# Patient Record
Sex: FEMALE | Race: WHITE
[De-identification: names, ages, dates, MRNs, and addresses within clinical notes are randomized per-mention and may not be internally consistent; named-entity substitution may affect disease eponyms.]

---

## 2018-03-04 NOTE — RAD
RADIOGRAPH CHEST 1 VIEW:

 

HISTORY: 

A 62-year-old female with dyspnea.

 

FINDINGS:

There is hyperinflation of the lungs, consistent with COPD.  There is no evidence of air space densit
y, pneumothorax, or pulmonary edema.  The lateral costophrenic angles are sharp.  The cardiomediastin
al silhouette is normal.

 

IMPRESSION:

1) No acute pulmonary findings.

2) Emphysema.

 

 

faizan []

 

POS: DION[facility]

## 2018-03-04 NOTE — CT
CT OF THE THORAX WITH IV CONTRAST:

 

INDICATION: 

History of posterior right rib mass.

 

FINDINGS: 

There is a healing right posterior 10th rib fracture.  There are healing suspected posterior 9th and 
10th rib fractures.  There is also a nondisplaced healing right posterolateral 8th rib fracture.

 

No additional acute osseous abnormality is evident.  There are healed lateral left 9th and posterolat
eral left 10th rib fractures.  

 

There is emphysematous change.  There is calcified granuloma within the right middle lobe.  Calcified
 lymph nodes within the right hilar region.  

 

IMPRESSION: 

1.  Healing right 8th through 10th rib fractures seen posterolateral and posteriorly.

 

2.  Emphysema.

 

3.  Findings of prior granulomatous disease.

 

POS: H

## 2019-11-12 ENCOUNTER — HOSPITAL ENCOUNTER (EMERGENCY)
Dept: HOSPITAL 18 - NAV ERS | Age: 64
Discharge: HOME | End: 2019-11-12
Payer: SELF-PAY

## 2019-11-12 DIAGNOSIS — F17.210: ICD-10-CM

## 2019-11-12 DIAGNOSIS — Z79.1: ICD-10-CM

## 2019-11-12 DIAGNOSIS — Z79.899: ICD-10-CM

## 2019-11-12 DIAGNOSIS — S20.211A: Primary | ICD-10-CM

## 2019-11-12 DIAGNOSIS — I10: ICD-10-CM
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
## 2019-11-12 DIAGNOSIS — J44.9: ICD-10-CM

## 2019-11-12 LAB
ALBUMIN SERPL BCG-MCNC: 4.3 G/DL (ref 3.4–4.8)
ALP SERPL-CCNC: 70 U/L (ref 40–110)
ALT SERPL W P-5'-P-CCNC: 8 U/L (ref 8–55)
ANION GAP SERPL CALC-SCNC: 16 MMOL/L (ref 10–20)
AST SERPL-CCNC: 12 U/L (ref 5–34)
BASOPHILS # BLD AUTO: 0.1 THOU/UL (ref 0–0.2)
BASOPHILS NFR BLD AUTO: 1.5 % (ref 0–1)
BILIRUB SERPL-MCNC: 0.2 MG/DL (ref 0.2–1.2)
BUN SERPL-MCNC: 21 MG/DL (ref 9.8–20.1)
CALCIUM SERPL-MCNC: 9.8 MG/DL (ref 7.8–10.44)
CHLORIDE SERPL-SCNC: 105 MMOL/L (ref 98–107)
CK SERPL-CCNC: 69 U/L (ref 29–168)
CO2 SERPL-SCNC: 26 MMOL/L (ref 23–31)
CREAT CL PREDICTED SERPL C-G-VRATE: 0 ML/MIN (ref 70–130)
EOSINOPHIL # BLD AUTO: 0.7 THOU/UL (ref 0–0.7)
EOSINOPHIL NFR BLD AUTO: 8.2 % (ref 0–10)
GLOBULIN SER CALC-MCNC: 2.9 G/DL (ref 2.4–3.5)
GLUCOSE SERPL-MCNC: 119 MG/DL (ref 80–115)
HGB BLD-MCNC: 12.9 G/DL (ref 12–16)
LIPASE SERPL-CCNC: 61 U/L (ref 8–78)
LYMPHOCYTES # BLD AUTO: 1.8 THOU/UL (ref 1.2–3.4)
LYMPHOCYTES NFR BLD AUTO: 19.6 % (ref 21–51)
MCH RBC QN AUTO: 29.5 PG (ref 27–31)
MCV RBC AUTO: 89.7 FL (ref 78–98)
MONOCYTES # BLD AUTO: 0.9 THOU/UL (ref 0.11–0.59)
MONOCYTES NFR BLD AUTO: 10 % (ref 0–10)
NEUTROPHILS # BLD AUTO: 5.5 THOU/UL (ref 1.4–6.5)
NEUTROPHILS NFR BLD AUTO: 60.7 % (ref 42–75)
PLATELET # BLD AUTO: 215 THOU/UL (ref 130–400)
POTASSIUM SERPL-SCNC: 3.9 MMOL/L (ref 3.5–5.1)
RBC # BLD AUTO: 4.36 MILL/UL (ref 4.2–5.4)
SODIUM SERPL-SCNC: 143 MMOL/L (ref 136–145)
WBC # BLD AUTO: 9.1 THOU/UL (ref 4.8–10.8)

## 2019-11-12 PROCEDURE — 84484 ASSAY OF TROPONIN QUANT: CPT

## 2019-11-12 PROCEDURE — 93005 ELECTROCARDIOGRAM TRACING: CPT

## 2019-11-12 PROCEDURE — 82550 ASSAY OF CK (CPK): CPT

## 2019-11-12 PROCEDURE — 83880 ASSAY OF NATRIURETIC PEPTIDE: CPT

## 2019-11-12 PROCEDURE — 83690 ASSAY OF LIPASE: CPT

## 2019-11-12 PROCEDURE — 80053 COMPREHEN METABOLIC PANEL: CPT

## 2019-11-12 PROCEDURE — 85025 COMPLETE CBC W/AUTO DIFF WBC: CPT

## 2019-11-12 PROCEDURE — 71250 CT THORAX DX C-: CPT

## 2019-11-12 NOTE — CT
CT chest noncontrast



HISTORY: Chest pain.



FINDINGS: Lungs remain prominently hyperinflated with scattered mild bullae formation. Calcified gran
ulomata and mediastinal lymph nodes are consistent with healed granulomatous disease. No

pneumothorax or pleural fluid.



Lack of contrast limits evaluation of the soft tissues. Nonspecific lymph nodes scattered about the m
ediastinum. Old right rib fractures again demonstrated.











IMPRESSION: Emphysema. No acute pulmonary abnormalities are demonstrated.



Atherosclerosis.



Reported By: ARNOLD Bender 

Electronically Signed:  11/12/2019 6:43 PM

## 2019-12-08 ENCOUNTER — HOSPITAL ENCOUNTER (EMERGENCY)
Dept: HOSPITAL 92 - ERS | Age: 64
Discharge: HOME | End: 2019-12-08
Payer: SELF-PAY

## 2019-12-08 DIAGNOSIS — Z79.51: ICD-10-CM

## 2019-12-08 DIAGNOSIS — J44.1: Primary | ICD-10-CM

## 2019-12-08 DIAGNOSIS — Z86.73: ICD-10-CM

## 2019-12-08 DIAGNOSIS — F32.9: ICD-10-CM

## 2019-12-08 DIAGNOSIS — Z79.899: ICD-10-CM

## 2019-12-08 DIAGNOSIS — F17.210: ICD-10-CM

## 2019-12-08 DIAGNOSIS — I10: ICD-10-CM

## 2019-12-08 PROCEDURE — 94640 AIRWAY INHALATION TREATMENT: CPT

## 2019-12-08 PROCEDURE — 71045 X-RAY EXAM CHEST 1 VIEW: CPT

## 2019-12-08 NOTE — RAD
EXAM: Single view of the chest



HISTORY:   Cough for one week



COMPARISON: 3/4/2018



FINDINGS: Single view of the chest shows a normal sized cardiomediastinal silhouette.  Increased inte
rstitial markings are present.  There is no evidence of consolidation, mass, or pleural effusion.

The bones are unremarkable.



IMPRESSION: No evidence of acute cardiopulmonary disease



Reported By: Chinmay Sheets 

Electronically Signed:  12/8/2019 3:05 PM

## 2020-07-03 ENCOUNTER — HOSPITAL ENCOUNTER (EMERGENCY)
Dept: HOSPITAL 18 - NAV ERS | Age: 65
Discharge: HOME | End: 2020-07-03
Payer: SELF-PAY

## 2020-07-03 DIAGNOSIS — M25.552: Primary | ICD-10-CM

## 2020-07-03 DIAGNOSIS — Z79.899: ICD-10-CM

## 2020-07-03 DIAGNOSIS — Z86.73: ICD-10-CM

## 2020-07-03 DIAGNOSIS — G47.00: ICD-10-CM

## 2020-07-03 DIAGNOSIS — Z79.51: ICD-10-CM

## 2020-07-03 DIAGNOSIS — J44.9: ICD-10-CM

## 2020-07-03 DIAGNOSIS — I10: ICD-10-CM

## 2020-07-03 DIAGNOSIS — F32.9: ICD-10-CM

## 2020-07-03 DIAGNOSIS — F17.210: ICD-10-CM

## 2020-07-03 PROCEDURE — 99283 EMERGENCY DEPT VISIT LOW MDM: CPT

## 2020-07-06 ENCOUNTER — HOSPITAL ENCOUNTER (EMERGENCY)
Dept: HOSPITAL 18 - NAV ERS | Age: 65
Discharge: HOME | End: 2020-07-06
Payer: SELF-PAY

## 2020-07-06 DIAGNOSIS — Z79.899: ICD-10-CM

## 2020-07-06 DIAGNOSIS — Z86.73: ICD-10-CM

## 2020-07-06 DIAGNOSIS — Z79.51: ICD-10-CM

## 2020-07-06 DIAGNOSIS — M54.9: ICD-10-CM

## 2020-07-06 DIAGNOSIS — F32.9: Primary | ICD-10-CM

## 2020-07-06 DIAGNOSIS — I10: ICD-10-CM

## 2020-07-06 DIAGNOSIS — J44.9: ICD-10-CM

## 2020-07-06 DIAGNOSIS — Z76.0: ICD-10-CM

## 2020-07-06 DIAGNOSIS — G89.29: ICD-10-CM

## 2020-07-06 DIAGNOSIS — Z87.891: ICD-10-CM

## 2020-07-06 LAB
ALBUMIN SERPL BCG-MCNC: 4.2 G/DL (ref 3.4–4.8)
ALP SERPL-CCNC: 75 U/L (ref 40–110)
ALT SERPL W P-5'-P-CCNC: 7 U/L (ref 8–55)
ANION GAP SERPL CALC-SCNC: 14 MMOL/L (ref 10–20)
APAP SERPL-MCNC: (no result) MCG/ML (ref 10–30)
AST SERPL-CCNC: 10 U/L (ref 5–34)
BASOPHILS # BLD AUTO: 0.1 THOU/UL (ref 0–0.2)
BASOPHILS NFR BLD AUTO: 1.2 % (ref 0–1)
BILIRUB SERPL-MCNC: 0.2 MG/DL (ref 0.2–1.2)
BUN SERPL-MCNC: 14 MG/DL (ref 9.8–20.1)
CALCIUM SERPL-MCNC: 9.9 MG/DL (ref 7.8–10.44)
CHLORIDE SERPL-SCNC: 102 MMOL/L (ref 98–107)
CO2 SERPL-SCNC: 24 MMOL/L (ref 23–31)
CREAT CL PREDICTED SERPL C-G-VRATE: 0 ML/MIN (ref 70–130)
DRUG SCREEN CUTOFF: (no result)
EOSINOPHIL # BLD AUTO: 0.7 THOU/UL (ref 0–0.7)
EOSINOPHIL NFR BLD AUTO: 8.7 % (ref 0–10)
GLOBULIN SER CALC-MCNC: 2.7 G/DL (ref 2.4–3.5)
GLUCOSE SERPL-MCNC: 139 MG/DL (ref 80–115)
HGB BLD-MCNC: 12.9 G/DL (ref 12–16)
LYMPHOCYTES # BLD AUTO: 1.3 THOU/UL (ref 1.2–3.4)
LYMPHOCYTES NFR BLD AUTO: 16 % (ref 21–51)
MCH RBC QN AUTO: 29.1 PG (ref 27–31)
MCV RBC AUTO: 95.5 FL (ref 78–98)
MEDTOX CONTROL LINE VALID?: (no result)
MONOCYTES # BLD AUTO: 0.5 THOU/UL (ref 0.11–0.59)
MONOCYTES NFR BLD AUTO: 6.8 % (ref 0–10)
NEUTROPHILS # BLD AUTO: 5.3 THOU/UL (ref 1.4–6.5)
NEUTROPHILS NFR BLD AUTO: 67.3 % (ref 42–75)
PLATELET # BLD AUTO: 190 THOU/UL (ref 130–400)
POTASSIUM SERPL-SCNC: 4.1 MMOL/L (ref 3.5–5.1)
RBC # BLD AUTO: 4.42 MILL/UL (ref 4.2–5.4)
SALICYLATES SERPL-MCNC: (no result) MG/DL (ref 15–30)
SODIUM SERPL-SCNC: 136 MMOL/L (ref 136–145)
SP GR UR STRIP: 1.01 (ref 1–1.03)
WBC # BLD AUTO: 7.8 THOU/UL (ref 4.8–10.8)

## 2020-07-06 PROCEDURE — 83605 ASSAY OF LACTIC ACID: CPT

## 2020-07-06 PROCEDURE — 80307 DRUG TEST PRSMV CHEM ANLYZR: CPT

## 2020-07-06 PROCEDURE — 51701 INSERT BLADDER CATHETER: CPT

## 2020-07-06 PROCEDURE — 84484 ASSAY OF TROPONIN QUANT: CPT

## 2020-07-06 PROCEDURE — 81003 URINALYSIS AUTO W/O SCOPE: CPT

## 2020-07-06 PROCEDURE — 71045 X-RAY EXAM CHEST 1 VIEW: CPT

## 2020-07-06 PROCEDURE — 80306 DRUG TEST PRSMV INSTRMNT: CPT

## 2020-07-06 PROCEDURE — 83880 ASSAY OF NATRIURETIC PEPTIDE: CPT

## 2020-07-06 PROCEDURE — 93005 ELECTROCARDIOGRAM TRACING: CPT

## 2020-07-06 PROCEDURE — 80053 COMPREHEN METABOLIC PANEL: CPT

## 2020-07-06 PROCEDURE — 85025 COMPLETE CBC W/AUTO DIFF WBC: CPT

## 2020-07-06 NOTE — RAD
PORTABLE CHEST 1 VIEW:

 

Date:  07/06/2020

Time:  1446 hours

 

HISTORY:  

Dyspnea. 

 

COMPARISON:  

12/08/2019. 

 

FINDINGS:

The heart size is normal. The aorta is tortuous. The lungs are well expanded without lobar consolidat
ion, pneumothoraces, or pleural effusions. 

 

IMPRESSION: 

No acute process. 

 

 

 

POS: ADELIA

## 2020-07-23 ENCOUNTER — HOSPITAL ENCOUNTER (EMERGENCY)
Dept: HOSPITAL 18 - NAV ERS | Age: 65
Discharge: HOME | End: 2020-07-23
Payer: SELF-PAY

## 2020-07-23 DIAGNOSIS — F32.9: ICD-10-CM

## 2020-07-23 DIAGNOSIS — I10: ICD-10-CM

## 2020-07-23 DIAGNOSIS — T67.5XXA: Primary | ICD-10-CM

## 2020-07-23 DIAGNOSIS — Z79.899: ICD-10-CM

## 2020-07-23 DIAGNOSIS — G47.00: ICD-10-CM

## 2020-07-23 DIAGNOSIS — F17.210: ICD-10-CM

## 2020-07-23 PROCEDURE — 99284 EMERGENCY DEPT VISIT MOD MDM: CPT
